# Patient Record
Sex: FEMALE | Race: WHITE | Employment: FULL TIME
[De-identification: names, ages, dates, MRNs, and addresses within clinical notes are randomized per-mention and may not be internally consistent; named-entity substitution may affect disease eponyms.]

---

## 2017-01-09 ENCOUNTER — SURGERY (OUTPATIENT)
Age: 52
End: 2017-01-09

## 2017-01-09 ENCOUNTER — TELEPHONE (OUTPATIENT)
Dept: GASTROENTEROLOGY | Facility: CLINIC | Age: 52
End: 2017-01-09

## 2017-04-19 ENCOUNTER — TELEPHONE (OUTPATIENT)
Dept: SURGERY | Facility: CLINIC | Age: 52
End: 2017-04-19

## 2017-04-19 NOTE — TELEPHONE ENCOUNTER
Left msg notifying pt that mammogram has been authorized & contact for central scheduling provided. Pt encouraged to contact office w/ questions/concerns.

## 2017-05-03 ENCOUNTER — HOSPITAL ENCOUNTER (OUTPATIENT)
Dept: MAMMOGRAPHY | Facility: HOSPITAL | Age: 52
Discharge: HOME | End: 2017-05-03
Attending: SURGERY
Payer: COMMERCIAL

## 2017-05-03 ENCOUNTER — TELEPHONE (OUTPATIENT)
Dept: SURGERY | Facility: CLINIC | Age: 52
End: 2017-05-03

## 2017-05-03 DIAGNOSIS — R92.8 ABNORMAL MAMMOGRAM OF LEFT BREAST: ICD-10-CM

## 2017-05-03 PROCEDURE — 77065 DX MAMMO INCL CAD UNI: CPT | Performed by: RADIOLOGY

## 2017-05-03 PROCEDURE — 77061 BREAST TOMOSYNTHESIS UNI: CPT | Performed by: RADIOLOGY

## 2017-05-10 ENCOUNTER — TELEPHONE (OUTPATIENT)
Dept: SURGERY | Facility: CLINIC | Age: 52
End: 2017-05-10

## 2017-05-15 ENCOUNTER — TELEPHONE (OUTPATIENT)
Dept: SURGERY | Facility: CLINIC | Age: 52
End: 2017-05-15

## 2017-05-15 NOTE — TELEPHONE ENCOUNTER
3rd attempt: Left msg for pt to return call to discuss imaging & poc    Pt was to be notified that Dr. David Vinson reviewed mammogram results and all looks stable. She should be able to resume annual screening per her PCP in November.  If she has any concerns wi

## 2017-05-17 ENCOUNTER — TELEPHONE (OUTPATIENT)
Dept: SURGERY | Facility: CLINIC | Age: 52
End: 2017-05-17

## 2017-05-17 NOTE — TELEPHONE ENCOUNTER
Left msg notifying pt that Dr. Karen Schmitz reviewed mammogram results and all looks stable. She should be able to resume annual screening per her PCP in November. If she has any concerns with her incision or exam, pt is to see Dr. Karen Schmitz as needed.  Pt encourage

## 2017-12-11 ENCOUNTER — TELEPHONE (OUTPATIENT)
Dept: INTERNAL MEDICINE CLINIC | Facility: CLINIC | Age: 52
End: 2017-12-11

## 2017-12-11 DIAGNOSIS — Z00.00 ROUTINE GENERAL MEDICAL EXAMINATION AT HEALTH CARE FACILITY: Primary | ICD-10-CM

## 2017-12-11 NOTE — TELEPHONE ENCOUNTER
Pt is scheduled for a px on 2/8. Pt asking to complete labs prior to appointment. Please advise on orders.

## 2017-12-14 NOTE — TELEPHONE ENCOUNTER
Pt returned call, states she would like to have lab work done at [address] [place] [address].

## 2017-12-14 NOTE — TELEPHONE ENCOUNTER
Zee He - please confirm with pt which lab is preferred ([facility] or [address] [place] [address]) so that orders can be generated.

## 2017-12-14 NOTE — TELEPHONE ENCOUNTER
CBC with differential, CMP, lipid panel, TSH, UA, B12, vitamin D. Routine physical.  Please check–patient may need to go to [facility] for her labs–she has signal for her insurance.   If so generate under [facility], have this printed out and either mail to her or h

## 2017-12-20 ENCOUNTER — OFFICE VISIT (OUTPATIENT)
Dept: FAMILY MEDICINE CLINIC | Facility: CLINIC | Age: 52
End: 2017-12-20

## 2017-12-20 VITALS
TEMPERATURE: 98 F | WEIGHT: 204 LBS | DIASTOLIC BLOOD PRESSURE: 82 MMHG | SYSTOLIC BLOOD PRESSURE: 116 MMHG | BODY MASS INDEX: 30.92 KG/M2 | RESPIRATION RATE: 12 BRPM | HEIGHT: 68 IN | HEART RATE: 88 BPM | OXYGEN SATURATION: 98 %

## 2017-12-20 DIAGNOSIS — N30.90 CYSTITIS: Primary | ICD-10-CM

## 2017-12-20 PROCEDURE — 81003 URINALYSIS AUTO W/O SCOPE: CPT | Performed by: NURSE PRACTITIONER

## 2017-12-20 PROCEDURE — 99202 OFFICE O/P NEW SF 15 MIN: CPT | Performed by: NURSE PRACTITIONER

## 2017-12-20 RX ORDER — NITROFURANTOIN 25; 75 MG/1; MG/1
100 CAPSULE ORAL 2 TIMES DAILY
Qty: 10 CAPSULE | Refills: 0 | Status: SHIPPED | OUTPATIENT
Start: 2017-12-20 | End: 2017-12-25

## 2017-12-20 NOTE — PATIENT INSTRUCTIONS
-Discussed with patient that most UTI’s are resolved after 3 days on antibiotic, but should continue for 5-7 days if symptoms persist.    -May take OTC Azo as directed for bladder spasms/pain with urination.   *Caution: this turns urine and bodily secretion long, germs may start to grow out of control. Parts of the urinary tract  The infection can occur in any part of the urinary tract. · The kidneys collect and store urine. · The ureters carry urine from the kidneys to the bladder.   · The bladder hold

## 2017-12-20 NOTE — PROGRESS NOTES
CHIEF COMPLAINT:   Patient presents with:  Urinary        HPI:   Eulalia Kyle is a 45year old female presents with symptoms of UTI. Complaining of urinary frequency, urgency, dysuria starting last night. Symptoms have been worsening since onset.   D NEURO: no headaches.       EXAM:   /82 (BP Location: Right arm, Patient Position: Sitting, Cuff Size: adult)   Pulse 88   Temp 98.4 °F (36.9 °C) (Oral)   Resp 12   Ht 68\"   Wt 204 lb   SpO2 98%   BMI 31.02 kg/m²   GENERAL: well developed, well nouris The patient is asked to return or follow up with PCP in 3 days if not better. Advised to seek immediate care for worsening symptoms. The patient indicates understanding of these issues and agrees to the plan.     Patient Instructions     -Discussed with · Bacteria on the skin outside the rectum may travel into the urethra. This is more common in women since the rectum and urethra are closer to each other than in men.  Wiping from front to back after using the toilet and keeping the area clean can help prev

## 2018-01-17 ENCOUNTER — OFFICE VISIT (OUTPATIENT)
Dept: FAMILY MEDICINE CLINIC | Facility: CLINIC | Age: 53
End: 2018-01-17

## 2018-01-17 ENCOUNTER — OFFICE VISIT (OUTPATIENT)
Dept: SURGERY | Facility: CLINIC | Age: 53
End: 2018-01-17

## 2018-01-17 ENCOUNTER — HOSPITAL ENCOUNTER (OUTPATIENT)
Facility: HOSPITAL | Age: 53
Discharge: HOME | End: 2018-01-17
Attending: SURGERY
Payer: COMMERCIAL

## 2018-01-17 VITALS
TEMPERATURE: 98 F | OXYGEN SATURATION: 97 % | DIASTOLIC BLOOD PRESSURE: 80 MMHG | HEART RATE: 92 BPM | RESPIRATION RATE: 18 BRPM | SYSTOLIC BLOOD PRESSURE: 120 MMHG

## 2018-01-17 VITALS
SYSTOLIC BLOOD PRESSURE: 120 MMHG | DIASTOLIC BLOOD PRESSURE: 80 MMHG | RESPIRATION RATE: 16 BRPM | TEMPERATURE: 98 F | OXYGEN SATURATION: 97 % | HEART RATE: 92 BPM

## 2018-01-17 DIAGNOSIS — N60.09 CYST OF BREAST, UNSPECIFIED LATERALITY: Primary | ICD-10-CM

## 2018-01-17 DIAGNOSIS — N30.01 ACUTE CYSTITIS WITH HEMATURIA: Primary | ICD-10-CM

## 2018-01-17 DIAGNOSIS — R30.0 DYSURIA: ICD-10-CM

## 2018-01-17 DIAGNOSIS — D24.2 BREAST FIBROADENOMA, LEFT: ICD-10-CM

## 2018-01-17 LAB
APPEARANCE: CLEAR
GLUCOSE (URINE DIPSTICK): 100 MG/DL
MULTISTIX LOT#: ABNORMAL NUMERIC
NITRITE, URINE: POSITIVE
PH, URINE: 5 (ref 4.5–8)
PROTEIN (URINE DIPSTICK): >300 MG/DL
SPECIFIC GRAVITY: 1.01 (ref 1–1.03)
UROBILINOGEN,SEMI-QN: 2 MG/DL (ref 0–1.9)

## 2018-01-17 PROCEDURE — 99213 OFFICE O/P EST LOW 20 MIN: CPT | Performed by: PHYSICIAN ASSISTANT

## 2018-01-17 PROCEDURE — 99214 OFFICE O/P EST MOD 30 MIN: CPT | Performed by: SURGERY

## 2018-01-17 PROCEDURE — 81003 URINALYSIS AUTO W/O SCOPE: CPT | Performed by: PHYSICIAN ASSISTANT

## 2018-01-17 PROCEDURE — 99211 OFF/OP EST MAY X REQ PHY/QHP: CPT | Performed by: SURGERY

## 2018-01-17 RX ORDER — SULFAMETHOXAZOLE AND TRIMETHOPRIM 800; 160 MG/1; MG/1
1 TABLET ORAL 2 TIMES DAILY
Qty: 10 TABLET | Refills: 0 | Status: SHIPPED | OUTPATIENT
Start: 2018-01-17 | End: 2018-01-22

## 2018-01-17 NOTE — PROGRESS NOTES
CHIEF COMPLAINT:     Patient presents with:  Urinary: states symptoms never completely go away, didn't take abx regularly       HPI:    Cecil Gutierrez is a 45year old female who presents with symptoms of UTI.  Complaining of urinary frequency and urgen Onset   • Other [OTHER] Mother    • Lipids Neg      hyperlipidemia, also no family h/o vascular disease      Smoking status: Former Smoker                                                              Packs/day: 1.00      Years: 10.00        Quit date: 1/1/ go away, didn't take abx regularly). Symptoms are consistent with:    Dysuria  Acute cystitis with hematuria  (primary encounter diagnosis)    PLAN   Increase water intake. Avoid caffenated beverages as can be bladder irritants.   Urine culture discussed, e

## 2018-02-08 ENCOUNTER — HOSPITAL ENCOUNTER (OUTPATIENT)
Dept: MAMMOGRAPHY | Facility: HOSPITAL | Age: 53
Discharge: HOME | End: 2018-02-08
Attending: SURGERY
Payer: COMMERCIAL

## 2018-02-08 ENCOUNTER — OFFICE VISIT (OUTPATIENT)
Dept: INTERNAL MEDICINE CLINIC | Facility: CLINIC | Age: 53
End: 2018-02-08

## 2018-02-08 VITALS
BODY MASS INDEX: 31.07 KG/M2 | HEART RATE: 82 BPM | SYSTOLIC BLOOD PRESSURE: 127 MMHG | RESPIRATION RATE: 16 BRPM | DIASTOLIC BLOOD PRESSURE: 82 MMHG | HEIGHT: 68 IN | WEIGHT: 205 LBS

## 2018-02-08 DIAGNOSIS — Z00.00 ROUTINE GENERAL MEDICAL EXAMINATION AT A HEALTH CARE FACILITY: ICD-10-CM

## 2018-02-08 DIAGNOSIS — D24.2 BREAST FIBROADENOMA, LEFT: ICD-10-CM

## 2018-02-08 DIAGNOSIS — N60.09 CYST OF BREAST, UNSPECIFIED LATERALITY: ICD-10-CM

## 2018-02-08 DIAGNOSIS — E78.5 HYPERLIPIDEMIA, UNSPECIFIED HYPERLIPIDEMIA TYPE: Primary | ICD-10-CM

## 2018-02-08 PROCEDURE — 77062 BREAST TOMOSYNTHESIS BI: CPT | Performed by: SURGERY

## 2018-02-08 PROCEDURE — 77066 DX MAMMO INCL CAD BI: CPT | Performed by: SURGERY

## 2018-02-08 PROCEDURE — 99396 PREV VISIT EST AGE 40-64: CPT | Performed by: INTERNAL MEDICINE

## 2018-02-08 RX ORDER — FENOFIBRATE 67 MG/1
67 CAPSULE ORAL
Qty: 90 CAPSULE | Refills: 2 | Status: SHIPPED | OUTPATIENT
Start: 2018-02-08 | End: 2019-02-12

## 2018-02-09 NOTE — ASSESSMENT & PLAN NOTE
Patient has been on fenofibrate, due for recheck labs which have been ordered and pending. Will follow-up after completed. She has tolerated medications well thus far.

## 2018-02-09 NOTE — PATIENT INSTRUCTIONS
Problem List Items Addressed This Visit        Unprioritized    Hyperlipidemia - Primary     Patient has been on fenofibrate, due for recheck labs which have been ordered and pending. Will follow-up after completed.   She has tolerated medications well thu

## 2018-02-09 NOTE — ASSESSMENT & PLAN NOTE
Normal exam.  Labs as ordered. Skin check normal.  Multiple scattered atypical nevi, advised to follow-up with dermatology for an evaluation–[phone] for an appointment with Dr. Georgi Adkins.   Breast exam completed–no palpable abnormalities, discharge from th

## 2018-02-09 NOTE — PROGRESS NOTES
HPI:    Patient ID: Salvatore Gray is a 45year old female. Physical    Pap Smear,3 Years due on 08/12/2019  Mammogram,1 Yr due on 02/03/2019    lmp-1/20/2018.     G7,P3-NVD,4 MISCARRIAGES.  LCB-19 YRS BACK    Colonoscopy,10 Years due on 01/09/2027 Skin: Negative. Allergic/Immunologic: Negative. Neurological: Negative. Hematological: Negative. Psychiatric/Behavioral: Negative.                Current Outpatient Prescriptions:  fenofibrate micronized 67 MG Oral Cap Take 1 capsule (67 mg to bleeding. There is no rash, tenderness or lesion on the right labia. There is no rash, tenderness or lesion on the left labia. Uterus is not deviated, not enlarged and not tender. Cervix exhibits no motion tenderness, no discharge and no friability.  Right orifices intact. Rectal exam normal,no palpable abnormalities. Hemorrhoids-none significant. Brown stools,guaic negetive  Colonoscopy,10 Years due on 01/09/2027    Pelvic exam completed–no cervical movement tenderness, adnexal palpable abnormalities.   Pina Flynn

## 2018-11-02 ENCOUNTER — PATIENT MESSAGE (OUTPATIENT)
Dept: INTERNAL MEDICINE CLINIC | Facility: CLINIC | Age: 53
End: 2018-11-02

## 2018-11-03 NOTE — TELEPHONE ENCOUNTER
Routed to Dr QUEEN for advise, thanks.     Future Appointments   Date Time Provider Olga Burris   12/31/2018  9:30 AM Kaz Al MD [facility] Cari Fuchs

## 2018-11-03 NOTE — TELEPHONE ENCOUNTER
From: Marcy Leal [facility]  To: Trino Carlin MD  Sent: 11/2/2018 11:48 AM CDT  Subject: Referral Request    I wanted to have blood work done in advance of my 12/31 appointment and wanted to confirm if I need a referral for the lab.  I would use he lab at 16

## 2018-12-27 NOTE — TELEPHONE ENCOUNTER
Dr. Sheryl Valdez, please advise.  This is patient's second attempt to contact us for labs with no response for 1 month.    ----- Message -----  Sagar Push From: Placido Comer     Sent: 12/26/2018  7:46 AM CST       To: Jesús Monson MD  Subject: RE: Referral Request

## 2019-01-17 ENCOUNTER — TELEPHONE (OUTPATIENT)
Dept: INTERNAL MEDICINE CLINIC | Facility: CLINIC | Age: 54
End: 2019-01-17

## 2019-01-17 NOTE — TELEPHONE ENCOUNTER
BENRICE - please advise if you would like to order any labs prior to upcoming Px appt on 02/12/19.     LOV - 02/08/2018  Last labs - 08/2016

## 2019-01-19 ENCOUNTER — MED REC SCAN ONLY (OUTPATIENT)
Dept: INTERNAL MEDICINE CLINIC | Facility: CLINIC | Age: 54
End: 2019-01-19

## 2019-01-19 ENCOUNTER — OFFICE VISIT (OUTPATIENT)
Dept: DERMATOLOGY CLINIC | Facility: CLINIC | Age: 54
End: 2019-01-19
Payer: COMMERCIAL

## 2019-01-19 DIAGNOSIS — D23.5 BENIGN NEOPLASM OF SKIN OF TRUNK, EXCEPT SCROTUM: Primary | ICD-10-CM

## 2019-01-19 DIAGNOSIS — L82.1 SEBORRHEIC KERATOSES: ICD-10-CM

## 2019-01-19 DIAGNOSIS — D23.4 BENIGN NEOPLASM OF SCALP AND SKIN OF NECK: ICD-10-CM

## 2019-01-19 DIAGNOSIS — D23.60 BENIGN NEOPLASM OF SKIN OF UPPER LIMB, INCLUDING SHOULDER, UNSPECIFIED LATERALITY: ICD-10-CM

## 2019-01-19 DIAGNOSIS — L57.0 AK (ACTINIC KERATOSIS): ICD-10-CM

## 2019-01-19 DIAGNOSIS — D22.9 MULTIPLE NEVI: ICD-10-CM

## 2019-01-19 PROCEDURE — 99212 OFFICE O/P EST SF 10 MIN: CPT | Performed by: DERMATOLOGY

## 2019-01-19 PROCEDURE — 99213 OFFICE O/P EST LOW 20 MIN: CPT | Performed by: DERMATOLOGY

## 2019-01-19 RX ORDER — MELOXICAM 7.5 MG/1
TABLET ORAL
COMMUNITY
Start: 2019-01-15 | End: 2019-03-13 | Stop reason: ALTCHOICE

## 2019-01-20 NOTE — TELEPHONE ENCOUNTER
The labs were ordered and in the chart 12/17/2017. If she had done these anytime before the 16 th of dec 2018 -she could have this completed,she took more than one year to come in for tests and hence could not get them  Reordered so please let her know.

## 2019-01-28 NOTE — PROGRESS NOTES
Itzel Berrios is a 52year old female. HPI:     CC:  Patient presents with:  Full Skin Exam: LOV 9/3/2016 Patient present with lesions of concern . Patient requesting full body skin exam .Patient has no personal hx of skin cancer.  Patient has patient Hyperlipidemia    • Lipid screening 7/26/2013    per NG   • PONV (postoperative nausea and vomiting)    • Visual impairment     glasses     Past Surgical History:   Procedure Laterality Date   • APPENDECTOMY     • BREAST BIOPSY Left 11/10/2016    Performed Asked        Seat Belt: Not Asked        Self-Exams: Not Asked        Grew up on a farm: Not Asked        History of tanning: Not Asked        Outdoor occupation: Not Asked        Pt has a pacemaker: No        Pt has a defibrillator: No        Breast feedi brown papule left scapula    Otherwise remarkable for lesions as noted on map. See Assessment /Plan for additional history and physical exam also:    Assessment / plan:    No orders of the defined types were placed in this encounter.       Meds & Refills f

## 2019-02-09 ENCOUNTER — APPOINTMENT (OUTPATIENT)
Dept: LAB | Age: 54
End: 2019-02-09
Attending: INTERNAL MEDICINE
Payer: COMMERCIAL

## 2019-02-09 PROCEDURE — 82607 VITAMIN B-12: CPT | Performed by: INTERNAL MEDICINE

## 2019-02-09 PROCEDURE — 81001 URINALYSIS AUTO W/SCOPE: CPT | Performed by: INTERNAL MEDICINE

## 2019-02-09 PROCEDURE — 36415 COLL VENOUS BLD VENIPUNCTURE: CPT | Performed by: INTERNAL MEDICINE

## 2019-02-09 PROCEDURE — 85025 COMPLETE CBC W/AUTO DIFF WBC: CPT | Performed by: INTERNAL MEDICINE

## 2019-02-09 PROCEDURE — 80061 LIPID PANEL: CPT | Performed by: INTERNAL MEDICINE

## 2019-02-09 PROCEDURE — 82306 VITAMIN D 25 HYDROXY: CPT | Performed by: INTERNAL MEDICINE

## 2019-02-09 PROCEDURE — 80053 COMPREHEN METABOLIC PANEL: CPT | Performed by: INTERNAL MEDICINE

## 2019-02-09 PROCEDURE — 84443 ASSAY THYROID STIM HORMONE: CPT | Performed by: INTERNAL MEDICINE

## 2019-02-12 ENCOUNTER — OFFICE VISIT (OUTPATIENT)
Dept: INTERNAL MEDICINE CLINIC | Facility: CLINIC | Age: 54
End: 2019-02-12
Payer: COMMERCIAL

## 2019-02-12 VITALS
SYSTOLIC BLOOD PRESSURE: 143 MMHG | HEIGHT: 68 IN | RESPIRATION RATE: 16 BRPM | BODY MASS INDEX: 31.67 KG/M2 | HEART RATE: 81 BPM | WEIGHT: 209 LBS | DIASTOLIC BLOOD PRESSURE: 82 MMHG

## 2019-02-12 DIAGNOSIS — E55.9 VITAMIN D DEFICIENCY: Primary | ICD-10-CM

## 2019-02-12 DIAGNOSIS — R92.2 DENSE BREAST: ICD-10-CM

## 2019-02-12 DIAGNOSIS — N95.0 POST-MENOPAUSAL BLEEDING: ICD-10-CM

## 2019-02-12 DIAGNOSIS — Z12.31 VISIT FOR SCREENING MAMMOGRAM: ICD-10-CM

## 2019-02-12 DIAGNOSIS — Z00.00 ROUTINE PHYSICAL EXAMINATION: ICD-10-CM

## 2019-02-12 DIAGNOSIS — E78.5 HYPERLIPIDEMIA, UNSPECIFIED HYPERLIPIDEMIA TYPE: ICD-10-CM

## 2019-02-12 PROCEDURE — 99396 PREV VISIT EST AGE 40-64: CPT | Performed by: INTERNAL MEDICINE

## 2019-02-12 RX ORDER — FENOFIBRATE 130 MG/1
130 CAPSULE ORAL
Qty: 90 CAPSULE | Refills: 2 | Status: SHIPPED | OUTPATIENT
Start: 2019-02-12 | End: 2019-11-13

## 2019-02-12 RX ORDER — ERGOCALCIFEROL 1.25 MG/1
50000 CAPSULE ORAL WEEKLY
Qty: 12 CAPSULE | Refills: 1 | Status: SHIPPED | OUTPATIENT
Start: 2019-02-12 | End: 2019-07-30

## 2019-02-12 NOTE — ASSESSMENT & PLAN NOTE
Normal exam.  Labs as ordered. Skin check normal.  No significant abnormal nevi. Scattered atypical nevi present, patient has been followed up per dermatology on a regular basis.   Breast exam completed–no palpable abnormalities, discharge from the nipple

## 2019-02-12 NOTE — PROGRESS NOTES
HPI:    Patient ID: Candelario Egan is a 52year old female.     Physical    Pap Smear,3 Years due on 2019  Mammogram due on 2019    lmp 2018-post menopausal by a few months after 1 year    Obstetric History     T0    L0    SAB0 Current Outpatient Medications:  ergocalciferol 80572 units Oral Cap Take 1 capsule (50,000 Units total) by mouth once a week.  Disp: 12 capsule Rfl: 1   fenofibrate micronized 130 MG Oral Cap Take 1 capsule (130 mg total) by mouth every morning before tenderness, discharge or bleeding. There is no rash, tenderness or lesion on the right labia. There is no rash, tenderness or lesion on the left labia. Uterus is not deviated, not enlarged and not tender.  Cervix exhibits no motion tenderness, no discharge abnormal nevi. Scattered atypical nevi present, patient has been followed up per dermatology on a regular basis. Breast exam completed–no palpable abnormalities, discharge from the nipples or axillary adenopathy.   Eleonoomari Marinelli synthesis mammogram last completed Visit:  Requested Prescriptions     Signed Prescriptions Disp Refills   • ergocalciferol 66280 units Oral Cap 12 capsule 1     Sig: Take 1 capsule (50,000 Units total) by mouth once a week.    • fenofibrate micronized 130 MG Oral Cap 90 capsule 2     Sig: T

## 2019-02-12 NOTE — ASSESSMENT & PLAN NOTE
Patient has been on fenofibrate at 67 mg daily which she has tolerated well. Triglyceride levels have come down from 368-307 but continues to remain elevated at this time. Strict restriction of starches, sugars, alcohol in the diet discussed.   Increase t

## 2019-02-12 NOTE — PATIENT INSTRUCTIONS
Problem List Items Addressed This Visit        Unprioritized    Hyperlipidemia     Patient has been on fenofibrate at 67 mg daily which she has tolerated well. Triglyceride levels have come down from 368-307 but continues to remain elevated at this time. far.  Immunizations-    Immunization History   Administered Date(s) Administered   • TD 09/12/2000   • TDAP 10/28/2011              Vitamin D deficiency - Primary     Patient is advised to restart on vitamin D 50,000 units once a week for the next 6 months

## 2019-02-12 NOTE — ASSESSMENT & PLAN NOTE
Had an almost normal menstrual cycle after about a whole year of absence of cycles. Ultrasound of the pelvis has been ordered and will follow-up after completion.     Advised to see gynecology–Dr. Chris Busby for an evaluation after the ultrasound, [phone]

## 2019-02-12 NOTE — ASSESSMENT & PLAN NOTE
Patient is advised to restart on vitamin D 50,000 units once a week for the next 6 months. Recheck labs after supplementation.

## 2019-03-07 ENCOUNTER — HOSPITAL ENCOUNTER (OUTPATIENT)
Dept: ULTRASOUND IMAGING | Age: 54
Discharge: HOME | End: 2019-03-07
Attending: INTERNAL MEDICINE
Payer: COMMERCIAL

## 2019-03-07 ENCOUNTER — HOSPITAL ENCOUNTER (OUTPATIENT)
Dept: CT IMAGING | Age: 54
Discharge: HOME | End: 2019-03-07
Attending: INTERNAL MEDICINE

## 2019-03-07 DIAGNOSIS — Z13.9 SCREENING PROCEDURE: ICD-10-CM

## 2019-03-07 DIAGNOSIS — N95.0 POST-MENOPAUSAL BLEEDING: ICD-10-CM

## 2019-03-07 PROCEDURE — 76856 US EXAM PELVIC COMPLETE: CPT | Performed by: INTERNAL MEDICINE

## 2019-03-07 PROCEDURE — 76830 TRANSVAGINAL US NON-OB: CPT | Performed by: INTERNAL MEDICINE

## 2019-03-07 NOTE — PROGRESS NOTES
[facility] [address] SCORE=0  OA=127/80  Cholestec labs as follows:declined. Just had labs 2/9/19  TC=  HDL=  LDL=  TG=  GLUCOSE=  All results and risk factors discussed with patient; all questions and concerns addressed.   Binu Franco

## 2019-03-13 ENCOUNTER — OFFICE VISIT (OUTPATIENT)
Dept: INTERNAL MEDICINE CLINIC | Facility: CLINIC | Age: 54
End: 2019-03-13
Payer: COMMERCIAL

## 2019-03-13 VITALS
HEIGHT: 68 IN | DIASTOLIC BLOOD PRESSURE: 84 MMHG | SYSTOLIC BLOOD PRESSURE: 121 MMHG | BODY MASS INDEX: 31.19 KG/M2 | RESPIRATION RATE: 18 BRPM | HEART RATE: 79 BPM | TEMPERATURE: 98 F | WEIGHT: 205.81 LBS

## 2019-03-13 DIAGNOSIS — R91.1 LUNG NODULE: ICD-10-CM

## 2019-03-13 DIAGNOSIS — E78.5 HYPERLIPIDEMIA, UNSPECIFIED HYPERLIPIDEMIA TYPE: ICD-10-CM

## 2019-03-13 DIAGNOSIS — E55.9 VITAMIN D DEFICIENCY: ICD-10-CM

## 2019-03-13 DIAGNOSIS — N95.0 POST-MENOPAUSAL BLEEDING: Primary | ICD-10-CM

## 2019-03-13 PROCEDURE — 99212 OFFICE O/P EST SF 10 MIN: CPT | Performed by: INTERNAL MEDICINE

## 2019-03-13 PROCEDURE — 99214 OFFICE O/P EST MOD 30 MIN: CPT | Performed by: INTERNAL MEDICINE

## 2019-03-13 RX ORDER — RANITIDINE 150 MG/1
150 TABLET ORAL DAILY PRN
COMMUNITY
End: 2020-09-03 | Stop reason: ALTCHOICE

## 2019-03-13 NOTE — ASSESSMENT & PLAN NOTE
Postmenopausal bleeding–ultrasound of the uterus completed. Ovaries look normal.  Small fibroids noted. Endometrial thickness at 8.4 mm. Patient has not had a subsequent episode. She has an appointment upcoming with gynecology for further evaluation.

## 2019-03-13 NOTE — ASSESSMENT & PLAN NOTE
Patient's dose of fenofibrate has been increased to 130 mg daily. Advised strict diet control of fatty foods, fried foods, desserts and sugars. Recheck labs as ordered. CT calcium scoring study showed no significant calcified plaques noted.

## 2019-03-13 NOTE — ASSESSMENT & PLAN NOTE
CT calcium scoring heart scan did show multiple lung nodules bilaterally but predominantly on the right side with a calcified granuloma. No lymphadenopathy noted. Patient has a remote history of smoking about 7 pack years, quit about 20years of age.   Sh

## 2019-03-13 NOTE — PATIENT INSTRUCTIONS
Problem List Items Addressed This Visit        Unprioritized    Hyperlipidemia     Patient's dose of fenofibrate has been increased to 130 mg daily. Advised strict diet control of fatty foods, fried foods, desserts and sugars. Recheck labs as ordered.   C

## 2019-03-13 NOTE — PROGRESS NOTES
HPI:    Patient ID: Arvind Weston is a 52year old female. Impression    CONCLUSION:   1. CT calcium scoring over read was performed.    2. Multiple ill-defined ground-glass density nodular opacities throughout predominantly the right lung, which me sedentary lifestyle, dyslipidemia, family history and obesity. Review of Systems   Constitutional: Negative. HENT: Negative. Eyes: Negative. Respiratory: Negative. Cardiovascular: Negative. Negative for chest pain.    Gastrointestinal: Ne range of motion. She exhibits no edema or tenderness. Lymphadenopathy:     She has no cervical adenopathy. Neurological: She is alert and oriented to person, place, and time. She has normal reflexes. No cranial nerve deficit.  She exhibits normal muscle Placed This Encounter      Vitamin D, 25-Hydroxy [E]      Meds This Visit:  Requested Prescriptions      No prescriptions requested or ordered in this encounter       Imaging & Referrals:  CT CHEST(CONTRAST ONLY) (CPT=71260)       #[id number]

## 2019-03-13 NOTE — ASSESSMENT & PLAN NOTE
Advised to take vitamin D 50,000 units once a week for the next 12 weeks and recheck labs as ordered

## 2019-03-16 ENCOUNTER — HOSPITAL ENCOUNTER (OUTPATIENT)
Dept: MAMMOGRAPHY | Facility: HOSPITAL | Age: 54
Discharge: HOME | End: 2019-03-16
Attending: INTERNAL MEDICINE
Payer: COMMERCIAL

## 2019-03-16 DIAGNOSIS — Z12.31 VISIT FOR SCREENING MAMMOGRAM: ICD-10-CM

## 2019-03-16 DIAGNOSIS — R92.2 DENSE BREAST: ICD-10-CM

## 2019-03-16 PROCEDURE — 77063 BREAST TOMOSYNTHESIS BI: CPT | Performed by: INTERNAL MEDICINE

## 2019-03-16 PROCEDURE — 77067 SCR MAMMO BI INCL CAD: CPT | Performed by: INTERNAL MEDICINE

## 2019-03-21 ENCOUNTER — OFFICE VISIT (OUTPATIENT)
Dept: OBGYN CLINIC | Facility: CLINIC | Age: 54
End: 2019-03-21
Payer: COMMERCIAL

## 2019-03-21 ENCOUNTER — HOSPITAL ENCOUNTER (OUTPATIENT)
Dept: CT IMAGING | Facility: HOSPITAL | Age: 54
Discharge: HOME | End: 2019-03-21
Attending: INTERNAL MEDICINE
Payer: COMMERCIAL

## 2019-03-21 VITALS
BODY MASS INDEX: 31 KG/M2 | SYSTOLIC BLOOD PRESSURE: 133 MMHG | HEART RATE: 67 BPM | DIASTOLIC BLOOD PRESSURE: 83 MMHG | WEIGHT: 202 LBS

## 2019-03-21 DIAGNOSIS — R91.1 LUNG NODULE: ICD-10-CM

## 2019-03-21 DIAGNOSIS — N95.0 POST-MENOPAUSAL BLEEDING: Primary | ICD-10-CM

## 2019-03-21 LAB — CREAT BLD-MCNC: 0.9 MG/DL (ref 0.5–1.5)

## 2019-03-21 PROCEDURE — 99203 OFFICE O/P NEW LOW 30 MIN: CPT | Performed by: OBSTETRICS & GYNECOLOGY

## 2019-03-21 PROCEDURE — 71260 CT THORAX DX C+: CPT | Performed by: INTERNAL MEDICINE

## 2019-03-21 PROCEDURE — 82565 ASSAY OF CREATININE: CPT

## 2019-03-21 NOTE — PROGRESS NOTES
Nemiah Lesch is a 52year old female [id number] Patient's last menstrual period was 09/05/2018. Patient presents with:  Gyn Problem: POSTMENOPAUSAL BLEEDING    Patient presents today for postmenopausal bleeding.  She states that she had a menses in Sept Worry: Not on file        Inability: Not on file      Transportation needs:        Medical: Not on file        Non-medical: Not on file    Tobacco Use      Smoking status: Former Smoker        Packs/day: 1.00        Years: 10.00        Pack years: 8 Asked        Reaction to local anesthetic: No    Social History Narrative      Not on file      MEDICATIONS:    Current Outpatient Medications:   •  Multiple Vitamins-Minerals (MULTIVITAMIN ADULT OR), Take by mouth., Disp: , Rfl:   •  raNITIdine HCl 150 MG

## 2019-03-25 ENCOUNTER — OFFICE VISIT (OUTPATIENT)
Dept: OBGYN CLINIC | Facility: CLINIC | Age: 54
End: 2019-03-25
Payer: COMMERCIAL

## 2019-03-25 VITALS — SYSTOLIC BLOOD PRESSURE: 115 MMHG | DIASTOLIC BLOOD PRESSURE: 76 MMHG | HEART RATE: 84 BPM

## 2019-03-25 DIAGNOSIS — N84.1 CERVICAL POLYP: ICD-10-CM

## 2019-03-25 DIAGNOSIS — N95.0 POSTMENOPAUSAL BLEEDING: Primary | ICD-10-CM

## 2019-03-25 PROCEDURE — 58100 BIOPSY OF UTERUS LINING: CPT | Performed by: OBSTETRICS & GYNECOLOGY

## 2019-03-25 NOTE — PROCEDURES
Endometrial Biopsy     Pre-Procedure Care:   Consent was obtained. Procedure/risks were explained. Questions were answered. Correct patient was identified. Correct side and site were confirmed. Indication: PMB    Pre-Medications:     The patient was

## 2019-03-29 ENCOUNTER — HOSPITAL ENCOUNTER (OUTPATIENT)
Dept: MAMMOGRAPHY | Facility: HOSPITAL | Age: 54
Discharge: HOME | End: 2019-03-29
Attending: INTERNAL MEDICINE
Payer: COMMERCIAL

## 2019-03-29 ENCOUNTER — HOSPITAL ENCOUNTER (OUTPATIENT)
Dept: ULTRASOUND IMAGING | Facility: HOSPITAL | Age: 54
Discharge: HOME | End: 2019-03-29
Attending: INTERNAL MEDICINE
Payer: COMMERCIAL

## 2019-03-29 DIAGNOSIS — R92.8 ABNORMAL MAMMOGRAM: ICD-10-CM

## 2019-03-29 DIAGNOSIS — R92.2 INCONCLUSIVE MAMMOGRAM DUE TO DENSE BREASTS: ICD-10-CM

## 2019-03-29 PROCEDURE — 77061 BREAST TOMOSYNTHESIS UNI: CPT | Performed by: INTERNAL MEDICINE

## 2019-03-29 PROCEDURE — 76642 ULTRASOUND BREAST LIMITED: CPT | Performed by: INTERNAL MEDICINE

## 2019-03-29 PROCEDURE — 77065 DX MAMMO INCL CAD UNI: CPT | Performed by: INTERNAL MEDICINE

## 2019-07-30 RX ORDER — ERGOCALCIFEROL 1.25 MG/1
50000 CAPSULE ORAL WEEKLY
Qty: 12 CAPSULE | Refills: 1 | Status: SHIPPED | OUTPATIENT
Start: 2019-07-30 | End: 2020-01-09

## 2019-07-30 NOTE — TELEPHONE ENCOUNTER
Review pended refill request as it does not fall under a protocol.     My Chart Message sent to pt requesting to complete labs    Last Rx: 2/12/19  LOV: Recent Visits  Date Type Provider Dept   03/13/19 Office Visit [facility]   15

## 2019-11-11 NOTE — TELEPHONE ENCOUNTER
Pharmacy called in stating that patient requested refill on medication over the weekend, but they were unable to send electronically. Please advise.      Current Outpatient Medications:   •  fenofibrate micronized 130 MG Oral Cap, Take 1 capsule (130 mg

## 2019-11-13 RX ORDER — FENOFIBRATE 130 MG/1
130 CAPSULE ORAL
Qty: 90 CAPSULE | Refills: 1 | Status: SHIPPED | OUTPATIENT
Start: 2019-11-13 | End: 2020-05-12

## 2019-11-14 NOTE — TELEPHONE ENCOUNTER
Refill passed per [facility], [facility] protocol.   Cholesterol Medications  Protocol Criteria:  · Appointment scheduled in the past 12 months or in the next 3 months  · ALT & LDL on file in the past 12 months  · ALT result < 80  · LDL result <130   Recent Outpat

## 2020-01-09 RX ORDER — ERGOCALCIFEROL 1.25 MG/1
CAPSULE ORAL
Qty: 12 CAPSULE | Refills: 1 | Status: SHIPPED | OUTPATIENT
Start: 2020-01-09 | End: 2020-06-23

## 2020-03-02 ENCOUNTER — OFFICE VISIT (OUTPATIENT)
Dept: FAMILY MEDICINE CLINIC | Facility: CLINIC | Age: 55
End: 2020-03-02
Payer: COMMERCIAL

## 2020-03-02 VITALS
OXYGEN SATURATION: 98 % | DIASTOLIC BLOOD PRESSURE: 80 MMHG | RESPIRATION RATE: 14 BRPM | SYSTOLIC BLOOD PRESSURE: 124 MMHG | HEART RATE: 73 BPM

## 2020-03-02 DIAGNOSIS — H10.11 ALLERGIC CONJUNCTIVITIS OF RIGHT EYE: Primary | ICD-10-CM

## 2020-03-02 PROCEDURE — 99212 OFFICE O/P EST SF 10 MIN: CPT | Performed by: NURSE PRACTITIONER

## 2020-03-02 RX ORDER — OLOPATADINE HYDROCHLORIDE 2 MG/ML
1 SOLUTION/ DROPS OPHTHALMIC DAILY
Qty: 2.5 ML | Refills: 0 | Status: SHIPPED | OUTPATIENT
Start: 2020-03-02 | End: 2020-03-09

## 2020-03-02 NOTE — PATIENT INSTRUCTIONS
Conjunctivitis, Allergic    Conjunctivitis is an irritation of a thin membrane in the eye. This membrane is called the conjunctiva. It covers the white of the eye and the inside of the eyelid.  The condition is often called pink eye or red eye because the © 8223-8111 [employer] [address] [facility], [employer] [place]. All rights reserved. This information is not intended as a substitute for professional medical care. Always follow your healthcare professional's instructions.

## 2020-03-02 NOTE — PROGRESS NOTES
CHIEF COMPLAINT:   Patient presents with:  Pink Eye      HPI:   Jess Jorge is a 62year old female who presents with chief concern of \"pink eye\".  Right eye discomfort x 1 month, eye is itchy, she reports RIGHT eye with increased crusting in the m (Other) Mother    • Lipids Neg         hyperlipidemia, also no family h/o vascular disease      Social History    Tobacco Use      Smoking status: Former Smoker        Packs/day: 1.00        Years: 10.00        Pack years: 10        Types: Cigarettes Apply 1 drop to eye daily for 7 days. Risks, benefits, complications and side effects of meds discussed. Follow up w/ ophthalmologist if no improvement in 1 week. Patient voiced understanding.

## 2020-05-12 RX ORDER — FENOFIBRATE 130 MG/1
CAPSULE ORAL
Qty: 90 CAPSULE | Refills: 0 | Status: SHIPPED | OUTPATIENT
Start: 2020-05-12 | End: 2020-08-10

## 2020-06-23 RX ORDER — ERGOCALCIFEROL 1.25 MG/1
CAPSULE ORAL
Qty: 12 CAPSULE | Refills: 0 | Status: SHIPPED | OUTPATIENT
Start: 2020-06-23 | End: 2022-01-07

## 2020-08-10 RX ORDER — FENOFIBRATE 130 MG/1
CAPSULE ORAL
Qty: 90 CAPSULE | Refills: 1 | Status: SHIPPED | OUTPATIENT
Start: 2020-08-10 | End: 2021-11-24

## 2020-09-03 ENCOUNTER — APPOINTMENT (OUTPATIENT)
Dept: LAB | Facility: HOSPITAL | Age: 55
End: 2020-09-03
Attending: INTERNAL MEDICINE
Payer: COMMERCIAL

## 2020-09-03 ENCOUNTER — OFFICE VISIT (OUTPATIENT)
Dept: INTERNAL MEDICINE CLINIC | Facility: CLINIC | Age: 55
End: 2020-09-03
Payer: COMMERCIAL

## 2020-09-03 VITALS
HEART RATE: 73 BPM | WEIGHT: 208.63 LBS | DIASTOLIC BLOOD PRESSURE: 83 MMHG | BODY MASS INDEX: 31.62 KG/M2 | HEIGHT: 68 IN | SYSTOLIC BLOOD PRESSURE: 123 MMHG

## 2020-09-03 DIAGNOSIS — Z12.4 ROUTINE PAPANICOLAOU SMEAR: ICD-10-CM

## 2020-09-03 DIAGNOSIS — Z78.0 MENOPAUSE: ICD-10-CM

## 2020-09-03 DIAGNOSIS — Z00.00 ROUTINE GENERAL MEDICAL EXAMINATION AT A HEALTH CARE FACILITY: Primary | ICD-10-CM

## 2020-09-03 DIAGNOSIS — Z00.00 WELLNESS EXAMINATION: ICD-10-CM

## 2020-09-03 DIAGNOSIS — E55.9 VITAMIN D DEFICIENCY: ICD-10-CM

## 2020-09-03 DIAGNOSIS — R91.1 LUNG NODULE: ICD-10-CM

## 2020-09-03 DIAGNOSIS — E78.5 HYPERLIPIDEMIA, UNSPECIFIED HYPERLIPIDEMIA TYPE: ICD-10-CM

## 2020-09-03 PROBLEM — N95.0 POST-MENOPAUSAL BLEEDING: Status: RESOLVED | Noted: 2019-02-12 | Resolved: 2020-09-03

## 2020-09-03 LAB
ALBUMIN SERPL-MCNC: 3.8 G/DL (ref 3.4–5)
ALBUMIN/GLOB SERPL: 1.1 {RATIO} (ref 1–2)
ALP LIVER SERPL-CCNC: 84 U/L (ref 46–118)
ALT SERPL-CCNC: 42 U/L (ref 13–56)
ANION GAP SERPL CALC-SCNC: 5 MMOL/L (ref 0–18)
AST SERPL-CCNC: 21 U/L (ref 15–37)
BACTERIA UR QL AUTO: NEGATIVE /HPF
BASOPHILS # BLD AUTO: 0.07 X10(3) UL (ref 0–0.2)
BASOPHILS NFR BLD AUTO: 1 %
BILIRUB SERPL-MCNC: 0.6 MG/DL (ref 0.1–2)
BILIRUB UR QL: NEGATIVE
BUN BLD-MCNC: 17 MG/DL (ref 7–18)
BUN/CREAT SERPL: 14.4 (ref 10–20)
CALCIUM BLD-MCNC: 9.6 MG/DL (ref 8.5–10.1)
CHLORIDE SERPL-SCNC: 110 MMOL/L (ref 98–112)
CHOLEST SMN-MCNC: 176 MG/DL (ref ?–200)
CO2 SERPL-SCNC: 27 MMOL/L (ref 21–32)
COLOR UR: YELLOW
CREAT BLD-MCNC: 1.18 MG/DL (ref 0.55–1.02)
DEPRECATED RDW RBC AUTO: 38.5 FL (ref 35.1–46.3)
EOSINOPHIL # BLD AUTO: 0.18 X10(3) UL (ref 0–0.7)
EOSINOPHIL NFR BLD AUTO: 2.6 %
ERYTHROCYTE [DISTWIDTH] IN BLOOD BY AUTOMATED COUNT: 12.7 % (ref 11–15)
GLOBULIN PLAS-MCNC: 3.4 G/DL (ref 2.8–4.4)
GLUCOSE BLD-MCNC: 87 MG/DL (ref 70–99)
GLUCOSE UR-MCNC: NEGATIVE MG/DL
HCT VFR BLD AUTO: 40.9 % (ref 35–48)
HDLC SERPL-MCNC: 37 MG/DL (ref 40–59)
HGB BLD-MCNC: 13.9 G/DL (ref 12–16)
IMM GRANULOCYTES # BLD AUTO: 0.02 X10(3) UL (ref 0–1)
IMM GRANULOCYTES NFR BLD: 0.3 %
KETONES UR-MCNC: NEGATIVE MG/DL
LDLC SERPL CALC-MCNC: 92 MG/DL (ref ?–100)
LYMPHOCYTES # BLD AUTO: 2.04 X10(3) UL (ref 1–4)
LYMPHOCYTES NFR BLD AUTO: 29.9 %
M PROTEIN MFR SERPL ELPH: 7.2 G/DL (ref 6.4–8.2)
MCH RBC QN AUTO: 28.5 PG (ref 26–34)
MCHC RBC AUTO-ENTMCNC: 34 G/DL (ref 31–37)
MCV RBC AUTO: 83.8 FL (ref 80–100)
MONOCYTES # BLD AUTO: 0.54 X10(3) UL (ref 0.1–1)
MONOCYTES NFR BLD AUTO: 7.9 %
NEUTROPHILS # BLD AUTO: 3.97 X10 (3) UL (ref 1.5–7.7)
NEUTROPHILS # BLD AUTO: 3.97 X10(3) UL (ref 1.5–7.7)
NEUTROPHILS NFR BLD AUTO: 58.3 %
NITRITE UR QL STRIP.AUTO: NEGATIVE
NONHDLC SERPL-MCNC: 139 MG/DL (ref ?–130)
OSMOLALITY SERPL CALC.SUM OF ELEC: 295 MOSM/KG (ref 275–295)
PATIENT FASTING Y/N/NP: YES
PATIENT FASTING Y/N/NP: YES
PH UR: 6 [PH] (ref 5–8)
PLATELET # BLD AUTO: 272 10(3)UL (ref 150–450)
POTASSIUM SERPL-SCNC: 4 MMOL/L (ref 3.5–5.1)
PROT UR-MCNC: NEGATIVE MG/DL
RBC # BLD AUTO: 4.88 X10(6)UL (ref 3.8–5.3)
RBC #/AREA URNS AUTO: 5 /HPF
SODIUM SERPL-SCNC: 142 MMOL/L (ref 136–145)
SP GR UR STRIP: 1.02 (ref 1–1.03)
TRIGL SERPL-MCNC: 233 MG/DL (ref 30–149)
TSI SER-ACNC: 2.36 MIU/ML (ref 0.36–3.74)
UROBILINOGEN UR STRIP-ACNC: <2
VIT B12 SERPL-MCNC: 431 PG/ML (ref 193–986)
VLDLC SERPL CALC-MCNC: 47 MG/DL (ref 0–30)
WBC # BLD AUTO: 6.8 X10(3) UL (ref 4–11)
WBC #/AREA URNS AUTO: 2 /HPF

## 2020-09-03 PROCEDURE — 82607 VITAMIN B-12: CPT | Performed by: INTERNAL MEDICINE

## 2020-09-03 PROCEDURE — 80053 COMPREHEN METABOLIC PANEL: CPT | Performed by: INTERNAL MEDICINE

## 2020-09-03 PROCEDURE — 3074F SYST BP LT 130 MM HG: CPT | Performed by: INTERNAL MEDICINE

## 2020-09-03 PROCEDURE — 85025 COMPLETE CBC W/AUTO DIFF WBC: CPT | Performed by: INTERNAL MEDICINE

## 2020-09-03 PROCEDURE — 99396 PREV VISIT EST AGE 40-64: CPT | Performed by: INTERNAL MEDICINE

## 2020-09-03 PROCEDURE — 84443 ASSAY THYROID STIM HORMONE: CPT | Performed by: INTERNAL MEDICINE

## 2020-09-03 PROCEDURE — 3079F DIAST BP 80-89 MM HG: CPT | Performed by: INTERNAL MEDICINE

## 2020-09-03 PROCEDURE — 36415 COLL VENOUS BLD VENIPUNCTURE: CPT | Performed by: INTERNAL MEDICINE

## 2020-09-03 PROCEDURE — 3008F BODY MASS INDEX DOCD: CPT | Performed by: INTERNAL MEDICINE

## 2020-09-03 PROCEDURE — 82306 VITAMIN D 25 HYDROXY: CPT | Performed by: INTERNAL MEDICINE

## 2020-09-03 PROCEDURE — 81001 URINALYSIS AUTO W/SCOPE: CPT | Performed by: INTERNAL MEDICINE

## 2020-09-03 PROCEDURE — 80061 LIPID PANEL: CPT | Performed by: INTERNAL MEDICINE

## 2020-09-03 NOTE — PROGRESS NOTES
HPI:   Alissa Mayo is a 60year old female who presents for an Annual Health Visit. Pap smear due at this time. Mammogram and bone density scan due. Colonoscopy due in 2027. Skin checks yearly due to she has an appointment scheduled.   Vision Packs/day: 1.00        Years: 10.00        Pack years: 10        Types: Cigarettes        Quit date: 1985        Years since quittin.7      Smokeless tobacco: Never Used    Alcohol use:  Yes      Alcohol/week: 2.0 standard drinks      Types: 2 Cristopher China normal and breath sounds normal. She has no wheezes. She has no rales. She exhibits no mass and no tenderness. Right breast exhibits no inverted nipple, no mass, no nipple discharge, no skin change and no tenderness.  Left breast exhibits no inverted nipple note and vitals reviewed. ASSESSMENT AND PLAN:   Jessica Sanchez was seen today for physical and pap.     Diagnoses and all orders for this visit:    Routine general medical examination at a health care facility  -     CARD TREADMILL STRESS, ADULT (CPT=93017 CT CHEST(CONTRAST ONLY) (CPT=71260)    Routine general medical examination at a [facility] care facility - Primary     Normal exam.  Labs as ordered. Skin check normal.  No significant abnormal nevi.   Breast exam completed–no palpable abnormalities, discharge Papanicolaou smear     Vitamin D deficiency     Screening for colorectal cancer     Benign neoplasm of skin     Lung nodule      Emerson Dudley MD  9/3/2020  10:16 AM

## 2020-09-03 NOTE — ASSESSMENT & PLAN NOTE
CT scan of the chest to evaluate multiple lung nodules showed a calcified granuloma and healing prior inflammatory process. Family history of lung cancer in her sister who was a smoker.   Patient herself has a remote history of smoking about 7 pack years a

## 2020-09-03 NOTE — ASSESSMENT & PLAN NOTE
Normal exam.  Labs as ordered. Skin check normal.  No significant abnormal nevi. Breast exam completed–no palpable abnormalities, discharge from the nipples or axillary adenopathy. Mammogram has been ordered but not completed.   She will have this done A

## 2020-09-03 NOTE — ASSESSMENT & PLAN NOTE
Patient has been on fenofibrate at 130 mg daily which she has tolerated well. Recheck labs are due at this time–orders provided. CT calcium scoring heart scan did not show any significant elevation in the calcium scores.   Consider treadmill stress test–o

## 2020-09-03 NOTE — PATIENT INSTRUCTIONS
Problem List Items Addressed This Visit        Unprioritized    Hyperlipidemia     Patient has been on fenofibrate at 130 mg daily which she has tolerated well. Recheck labs are due at this time–orders provided.   CT calcium scoring heart scan did not show (14)    LIPID PANEL    ASSAY, THYROID STIM HORMONE    URINALYSIS, ROUTINE    VITAMIN B12    VITAMIN D, 25-HYDROXY    Routine Papanicolaou smear     Pap smear completed today.          Relevant Orders    THINPREP PAP SMEAR B    HPV HIGH RISK , THIN PREP TISHA

## 2020-09-04 LAB
25(OH)D3 SERPL-MCNC: 29.7 NG/ML (ref 30–100)
HPV I/H RISK 1 DNA SPEC QL NAA+PROBE: NEGATIVE

## 2020-09-05 ENCOUNTER — OFFICE VISIT (OUTPATIENT)
Dept: DERMATOLOGY CLINIC | Facility: CLINIC | Age: 55
End: 2020-09-05
Payer: COMMERCIAL

## 2020-09-05 DIAGNOSIS — D23.4 BENIGN NEOPLASM OF SCALP AND SKIN OF NECK: ICD-10-CM

## 2020-09-05 DIAGNOSIS — D22.9 MULTIPLE NEVI: ICD-10-CM

## 2020-09-05 DIAGNOSIS — D23.60 BENIGN NEOPLASM OF SKIN OF UPPER LIMB, INCLUDING SHOULDER, UNSPECIFIED LATERALITY: ICD-10-CM

## 2020-09-05 DIAGNOSIS — L57.0 AK (ACTINIC KERATOSIS): Primary | ICD-10-CM

## 2020-09-05 DIAGNOSIS — L82.1 SEBORRHEIC KERATOSES: ICD-10-CM

## 2020-09-05 DIAGNOSIS — D23.5 BENIGN NEOPLASM OF SKIN OF TRUNK, EXCEPT SCROTUM: ICD-10-CM

## 2020-09-05 PROCEDURE — 17000 DESTRUCT PREMALG LESION: CPT | Performed by: DERMATOLOGY

## 2020-09-05 PROCEDURE — 99213 OFFICE O/P EST LOW 20 MIN: CPT | Performed by: DERMATOLOGY
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
## 2020-09-09 NOTE — TELEPHONE ENCOUNTER
Pt called requesting orders.  Pt made several attempts via [employer] please see communication on 11/2 [FreeTextEntry1] : Chest x-ray PA lateral September 9, 2020\par Sarcoidosis\par Cardiac size are normal\par Uncoiled aorta\par No parenchymal infiltrates pleural effusions or dominant pulmonary nodules\par We will change compared to chest x-ray of November 14, 2019\par \par Pulmonary 6-minute walk stress test September 9, 2020\par \par Room air O2 saturation 97\par Persistent tachycardia\par Desaturation to 89% on room air\par Impression borderline desaturation\par No indication for portable oxygen therapy\par \par \par Spirometry 2/27/2020\par  moderate  reduction flow rates\par No BD at FEV1\par Obstructive pattern\par \par FENO  11  ppb  1/30 20\par \par Chest x-ray PA lateral normal cardiac size Nov 14 2019\par Uncoiled aorta\par No clear parenchymal infiltrates pleural effusions or dominant pulmonary nodules\par No interval change compared to chest x-ray barring difference in technique dating back to November 7, 2018\par \par PFT November 14, 2019\par Mild reduction in flow rates with an obstructive pattern\par Normal total lung capacity 80% predicted\par Mild air trapping RV/TLC ratio 129% predicted.\par Moderate reduction diffusion 56% predicted with a loss of functioning alveolocapillary units\par Heme globin 13.8\par Bronchodilator response at small airways 29%\par \par High-dose flu vaccination administered Sept 9 2020\par \par DAta 1/30/20\par \par Serum electrolytes normal\par Glucose 103\par BUN 23 creatinine 1.02\par Serum calcium 9.7 in patient with sarcoidosis\par ACE level normal range\par \par High-dose flu vaccination administered September 9, 2020

## 2020-09-12 ENCOUNTER — HOSPITAL ENCOUNTER (OUTPATIENT)
Dept: CT IMAGING | Facility: HOSPITAL | Age: 55
Discharge: HOME | End: 2020-09-12
Attending: INTERNAL MEDICINE
Payer: COMMERCIAL

## 2020-09-12 DIAGNOSIS — R91.1 LUNG NODULE: ICD-10-CM

## 2020-09-12 PROCEDURE — 71260 CT THORAX DX C+: CPT | Performed by: INTERNAL MEDICINE

## 2020-09-21 ENCOUNTER — HOSPITAL ENCOUNTER (OUTPATIENT)
Dept: MAMMOGRAPHY | Facility: HOSPITAL | Age: 55
Discharge: HOME | End: 2020-09-21
Attending: INTERNAL MEDICINE
Payer: COMMERCIAL

## 2020-09-21 DIAGNOSIS — Z00.00 ROUTINE CHECK-UP: ICD-10-CM

## 2020-09-21 PROCEDURE — 77063 BREAST TOMOSYNTHESIS BI: CPT | Performed by: INTERNAL MEDICINE

## 2020-09-21 PROCEDURE — 77067 SCR MAMMO BI INCL CAD: CPT | Performed by: INTERNAL MEDICINE

## 2020-09-22 ENCOUNTER — HOSPITAL ENCOUNTER (OUTPATIENT)
Dept: BONE DENSITY | Age: 55
Discharge: HOME | End: 2020-09-22
Attending: INTERNAL MEDICINE
Payer: COMMERCIAL

## 2020-09-22 DIAGNOSIS — Z78.0 MENOPAUSE: ICD-10-CM

## 2020-09-22 PROCEDURE — 77080 DXA BONE DENSITY AXIAL: CPT | Performed by: INTERNAL MEDICINE

## 2020-09-26 ENCOUNTER — LAB ENCOUNTER (OUTPATIENT)
Dept: LAB | Age: 55
End: 2020-09-26
Attending: INTERNAL MEDICINE
Payer: COMMERCIAL

## 2020-09-26 DIAGNOSIS — Z01.812 PRE-PROCEDURE LAB EXAM: Primary | ICD-10-CM

## 2020-09-26 LAB — SARS-COV-2 RNA RESP QL NAA+PROBE: NOT DETECTED

## 2020-09-29 ENCOUNTER — HOSPITAL ENCOUNTER (OUTPATIENT)
Dept: CV DIAGNOSTICS | Facility: HOSPITAL | Age: 55
Discharge: HOME | End: 2020-09-29
Attending: INTERNAL MEDICINE
Payer: COMMERCIAL

## 2020-09-29 DIAGNOSIS — E78.5 HYPERLIPIDEMIA, UNSPECIFIED HYPERLIPIDEMIA TYPE: ICD-10-CM

## 2020-09-29 DIAGNOSIS — Z00.00 ROUTINE GENERAL MEDICAL EXAMINATION AT A HEALTH CARE FACILITY: ICD-10-CM

## 2020-09-29 PROCEDURE — 93016 CV STRESS TEST SUPVJ ONLY: CPT | Performed by: INTERNAL MEDICINE

## 2020-09-29 PROCEDURE — 93017 CV STRESS TEST TRACING ONLY: CPT | Performed by: INTERNAL MEDICINE

## 2020-09-29 PROCEDURE — 93018 CV STRESS TEST I&R ONLY: CPT | Performed by: INTERNAL MEDICINE

## 2021-02-10 ENCOUNTER — OFFICE VISIT (OUTPATIENT)
Dept: FAMILY MEDICINE CLINIC | Facility: CLINIC | Age: 56
End: 2021-02-10
Payer: COMMERCIAL

## 2021-02-10 VITALS
BODY MASS INDEX: 31.07 KG/M2 | TEMPERATURE: 98 F | HEART RATE: 86 BPM | WEIGHT: 205 LBS | OXYGEN SATURATION: 99 % | RESPIRATION RATE: 12 BRPM | HEIGHT: 68 IN | SYSTOLIC BLOOD PRESSURE: 127 MMHG | DIASTOLIC BLOOD PRESSURE: 86 MMHG

## 2021-02-10 DIAGNOSIS — R94.4 DECREASED GFR: ICD-10-CM

## 2021-02-10 DIAGNOSIS — R30.0 DYSURIA: ICD-10-CM

## 2021-02-10 DIAGNOSIS — N30.01 ACUTE CYSTITIS WITH HEMATURIA: Primary | ICD-10-CM

## 2021-02-10 LAB
GLUCOSE (URINE DIPSTICK): 100 MG/DL
MULTISTIX LOT#: 1044 NUMERIC
NITRITE, URINE: POSITIVE
PH, URINE: 5 (ref 4.5–8)
PROTEIN (URINE DIPSTICK): 300 MG/DL
SPECIFIC GRAVITY: 1.03 (ref 1–1.03)
UROBILINOGEN,SEMI-QN: 1 MG/DL (ref 0–1.9)

## 2021-02-10 PROCEDURE — 87086 URINE CULTURE/COLONY COUNT: CPT | Performed by: NURSE PRACTITIONER

## 2021-02-10 PROCEDURE — 99213 OFFICE O/P EST LOW 20 MIN: CPT | Performed by: NURSE PRACTITIONER

## 2021-02-10 PROCEDURE — 81003 URINALYSIS AUTO W/O SCOPE: CPT | Performed by: NURSE PRACTITIONER

## 2021-02-10 PROCEDURE — 3008F BODY MASS INDEX DOCD: CPT | Performed by: NURSE PRACTITIONER

## 2021-02-10 PROCEDURE — 3074F SYST BP LT 130 MM HG: CPT | Performed by: NURSE PRACTITIONER

## 2021-02-10 PROCEDURE — 3079F DIAST BP 80-89 MM HG: CPT | Performed by: NURSE PRACTITIONER

## 2021-02-10 RX ORDER — NITROFURANTOIN 25; 75 MG/1; MG/1
100 CAPSULE ORAL 2 TIMES DAILY
Qty: 14 CAPSULE | Refills: 0 | Status: SHIPPED | OUTPATIENT
Start: 2021-02-10 | End: 2021-02-10 | Stop reason: ALTCHOICE

## 2021-02-10 RX ORDER — SULFAMETHOXAZOLE AND TRIMETHOPRIM 800; 160 MG/1; MG/1
1 TABLET ORAL 2 TIMES DAILY
Qty: 10 TABLET | Refills: 0 | Status: SHIPPED | OUTPATIENT
Start: 2021-02-10 | End: 2021-02-15

## 2021-02-10 NOTE — PROGRESS NOTES
CHIEF COMPLAINT:   Patient presents with:  Urinary Symptoms      HPI:   Franci Cali is a 60year old female who presents with symptoms of dysuria, frequency and urgency for last 1 days. Symptoms have been about the same since onset.  Treatments t Alcohol use:  Yes      Alcohol/week: 2.0 standard drinks      Types: 2 Standard drinks or equivalent per week      Comment: weekends    Drug use: No        REVIEW OF SYSTEMS:   GENERAL: Denies fever, chills, or body aches  SKIN: no rashes, no skin wounds Sig: Take 1 tablet by mouth 2 (two) times daily for 5 days. Risk and benefits of medication discussed. Stressed importance of completing full course of antibiotic unless told otherwise.     The patient indicates understanding of these issues and ag Change undergarments daily or when soiled/sweaty. Only use water to clean perineum area. F/U:  - Go to Emergency Dept  for fever >101, any vomiting, or increase in abdominal, back, or flank pain, or no improvement after 48 hours of antibiotics.   -S Bladder infections are not contagious. You can't get one from someone else, from a toilet seat, or from sharing a bath. The most common cause of bladder infections is bacteria from the bowels.  The bacteria get onto the skin around the opening of the ureth · Drink plenty of fluids. This helps to prevent dehydration and flush out your bladder. Do this unless you must restrict fluids for other health reasons, or your healthcare provider told you not to. · Clean yourself correctly after going to the bathroom. Linsey last reviewed this educational content on 11/1/2019  © 8372-4107 [employer]. All rights reserved. This information is not intended as a substitute for professional medical care.  Always follow your healthcare professional's instructi

## 2021-02-10 NOTE — PATIENT INSTRUCTIONS
If a urine culture was sent out, we will contact you with the results in 48-72 hours via phone or [employer]. If positive, then we will call in an appropriate antibiotic or change antibiotic if needed.  If negative, then you should stop antibiotics as it i -Schedule appt with PCP or gyne if symptoms persist after completion of antibiotics,  if negative urine culture, if there is possibility of STD, if vaginal/penile discharge or other symptoms.       Bladder Infection, Female (Adult)     Urine normally doesn' The most common cause of bladder infections is bacteria from the bowels. The bacteria get onto the skin around the opening of the urethra. From there, they can get into the urine. Then they travel up to the bladder, causing inflammation and infection.  This · Clean yourself correctly after going to the bathroom. Wipe from front to back after using the toilet. This helps prevent the spread of bacteria. · Urinate more often. Don't try to hold urine in for a long time.   · Wear loose-fitting clothes and cotton u © 8183-0463 [employer]. All rights reserved. This information is not intended as a substitute for professional medical care. Always follow your healthcare professional's instructions.

## 2021-03-03 ENCOUNTER — OFFICE VISIT (OUTPATIENT)
Dept: INTERNAL MEDICINE CLINIC | Facility: CLINIC | Age: 56
End: 2021-03-03
Payer: COMMERCIAL

## 2021-03-03 VITALS
HEIGHT: 68 IN | DIASTOLIC BLOOD PRESSURE: 78 MMHG | HEART RATE: 74 BPM | WEIGHT: 210 LBS | BODY MASS INDEX: 31.83 KG/M2 | RESPIRATION RATE: 18 BRPM | TEMPERATURE: 97 F | SYSTOLIC BLOOD PRESSURE: 116 MMHG

## 2021-03-03 DIAGNOSIS — N18.31 STAGE 3A CHRONIC KIDNEY DISEASE (HCC): ICD-10-CM

## 2021-03-03 DIAGNOSIS — E78.5 HYPERLIPIDEMIA, UNSPECIFIED HYPERLIPIDEMIA TYPE: Primary | ICD-10-CM

## 2021-03-03 DIAGNOSIS — K76.0 HEPATIC STEATOSIS: ICD-10-CM

## 2021-03-03 PROCEDURE — 99214 OFFICE O/P EST MOD 30 MIN: CPT | Performed by: INTERNAL MEDICINE

## 2021-03-03 PROCEDURE — 3078F DIAST BP <80 MM HG: CPT | Performed by: INTERNAL MEDICINE

## 2021-03-03 PROCEDURE — 3074F SYST BP LT 130 MM HG: CPT | Performed by: INTERNAL MEDICINE

## 2021-03-03 PROCEDURE — 3008F BODY MASS INDEX DOCD: CPT | Performed by: INTERNAL MEDICINE

## 2021-03-03 NOTE — ASSESSMENT & PLAN NOTE
Moderate to severe fatty liver changes as noted on the CT scan. This was an incidental finding. No history of liver dysfunction. Strict diet control to restrict sugars, alcohol in the diet. Cut back on fatty foods like nuts, and desserts.   Restrict slime

## 2021-03-03 NOTE — ASSESSMENT & PLAN NOTE
Lipid panel shows persistent elevation in the triglycerides, currently 233. LDL cholesterol looks stable most likely due to the elevation in the triglycerides. Patient has been on fenofibrate which she has tolerated well thus far.   Advised to strictly re

## 2021-03-03 NOTE — PROGRESS NOTES
HPI:    Patient ID: Yon Greco is a 60year old female. Written by Royce Lakhani MD on 9/13/2020  3:44 PM  Pap smear looks normal, HPV testing negative.    Urine test look normal.   Thyroid function test look normal.   The blood sugars, liver fu sedentary lifestyle, dyslipidemia, family history and obesity. Kidney Problem  Pertinent negatives include no chest pain. Review of Systems   Constitutional: Negative. HENT: Negative. Eyes: Negative. Respiratory: Negative.     Oren Beltran There is no abdominal tenderness. There is no rebound. Musculoskeletal: Normal range of motion. General: No tenderness or edema. Lymphadenopathy:     She has no cervical adenopathy.    Neurological: She is alert and oriented to person, place, an This was an incidental finding. No history of liver dysfunction. Strict diet control to restrict sugars, alcohol in the diet. Cut back on fatty foods like nuts, and desserts. Restrict cheeses in the diet. Exercise for about 20 minutes daily.   Drink pl

## 2021-03-03 NOTE — PATIENT INSTRUCTIONS
Problem List Items Addressed This Visit        Unprioritized    Hepatic steatosis     Moderate to severe fatty liver changes as noted on the CT scan. This was an incidental finding. No history of liver dysfunction.   Strict diet control to restrict sugars

## 2021-03-03 NOTE — ASSESSMENT & PLAN NOTE
CKD stage III based on EGFR. Patient is otherwise asymptomatic. Blood pressures are well controlled. She does not take any NSAIDs on a regular basis but have advised to avoid Advil, Aleve, ibuprofen, naproxen, aspirin like medications over-the-counter.

## 2021-07-06 NOTE — TELEPHONE ENCOUNTER
Please review; protocol failed or has no protocol. Please advise on adding order for Vit D with outstanding labs.     Requested Prescriptions   Pending Prescriptions Disp Refills    ERGOCALCIFEROL 1.25 MG (37830 UT) Oral Cap [Pharmacy Med Name: Vitamin D 50,000 Unit Cap Stri] 12 capsule 0     Sig: take one capsule by mouth every week        There is no refill protocol information for this order         Recent Outpatient Visits              4 months ago Hyperlipidemia, unspecified hyperlipidemia type    [facility], [address], [place]    Office Visit    4 months ago Acute cystitis with hematuria    [facility], APRN    Office Visit    10 months ago AK (actinic keratosis)    [facility] Singh Giles MD    Office Visit    10 months ago Routine general medical examination at a health care facility    [facility], [address], [place]    Office Visit    1 year ago Allergic conjunctivitis of right eye    [address] [facility], APRN    Office Visit          Future Appointments         Provider Department Appt Notes    In 1 month Alfonso Morrison MD [facility], [address], [place] Followup on bloodwork

## 2021-07-07 RX ORDER — ERGOCALCIFEROL 1.25 MG/1
50000 CAPSULE ORAL WEEKLY
Qty: 12 CAPSULE | Refills: 0 | OUTPATIENT
Start: 2021-07-07

## 2021-09-07 ENCOUNTER — PATIENT MESSAGE (OUTPATIENT)
Dept: INTERNAL MEDICINE CLINIC | Facility: CLINIC | Age: 56
End: 2021-09-07

## 2021-09-07 DIAGNOSIS — Z12.31 SCREENING MAMMOGRAM, ENCOUNTER FOR: Primary | ICD-10-CM

## 2021-09-07 NOTE — TELEPHONE ENCOUNTER
From: Beth Vang  To: Yoanna Hallman MD  Sent: 9/7/2021 3:51 PM CDT  Subject: Referral Request    Do I need a referral to book my annual mammogram screening? Wanted to check before I schedule an appointment. Thanks and hope you are well.     Silviano Brand

## 2021-10-19 ENCOUNTER — HOSPITAL ENCOUNTER (OUTPATIENT)
Dept: MAMMOGRAPHY | Facility: HOSPITAL | Age: 56
Discharge: HOME | End: 2021-10-19
Attending: INTERNAL MEDICINE
Payer: COMMERCIAL

## 2021-10-19 DIAGNOSIS — Z12.31 SCREENING MAMMOGRAM, ENCOUNTER FOR: ICD-10-CM

## 2021-10-19 PROCEDURE — 77067 SCR MAMMO BI INCL CAD: CPT | Performed by: INTERNAL MEDICINE

## 2021-10-19 PROCEDURE — 77063 BREAST TOMOSYNTHESIS BI: CPT | Performed by: INTERNAL MEDICINE

## 2021-11-09 ENCOUNTER — HOSPITAL ENCOUNTER (OUTPATIENT)
Dept: ULTRASOUND IMAGING | Facility: HOSPITAL | Age: 56
Discharge: HOME | End: 2021-11-09
Attending: INTERNAL MEDICINE
Payer: COMMERCIAL

## 2021-11-09 DIAGNOSIS — K76.0 HEPATIC STEATOSIS: ICD-10-CM

## 2021-11-09 PROCEDURE — 76705 ECHO EXAM OF ABDOMEN: CPT | Performed by: INTERNAL MEDICINE

## 2021-11-24 RX ORDER — FENOFIBRATE 130 MG/1
130 CAPSULE ORAL
Qty: 90 CAPSULE | Refills: 1 | Status: SHIPPED | OUTPATIENT
Start: 2021-11-24

## 2021-11-24 NOTE — TELEPHONE ENCOUNTER
Please review. Protocol failed or has no protocol. Advised patient that overdue for fasting labs. Will try and get them done in the next few days.    Requested Prescriptions   Pending Prescriptions Disp Refills    FENOFIBRATE MICRONIZED 130 MG Oral Cap [Ph

## 2021-11-26 ENCOUNTER — LAB ENCOUNTER (OUTPATIENT)
Dept: LAB | Age: 56
End: 2021-11-26
Attending: INTERNAL MEDICINE
Payer: COMMERCIAL

## 2021-11-26 PROCEDURE — 80053 COMPREHEN METABOLIC PANEL: CPT | Performed by: INTERNAL MEDICINE

## 2021-11-26 PROCEDURE — 36415 COLL VENOUS BLD VENIPUNCTURE: CPT | Performed by: INTERNAL MEDICINE

## 2021-11-26 PROCEDURE — 80061 LIPID PANEL: CPT | Performed by: INTERNAL MEDICINE

## 2021-11-26 PROCEDURE — 85025 COMPLETE CBC W/AUTO DIFF WBC: CPT | Performed by: INTERNAL MEDICINE

## 2021-12-11 ENCOUNTER — LAB ENCOUNTER (OUTPATIENT)
Dept: LAB | Facility: HOSPITAL | Age: 56
End: 2021-12-11
Attending: INTERNAL MEDICINE
Payer: COMMERCIAL

## 2021-12-11 DIAGNOSIS — E78.5 HYPERLIPIDEMIA: Primary | ICD-10-CM

## 2021-12-11 PROCEDURE — 83036 HEMOGLOBIN GLYCOSYLATED A1C: CPT | Performed by: INTERNAL MEDICINE

## 2021-12-11 PROCEDURE — 80061 LIPID PANEL: CPT

## 2021-12-11 PROCEDURE — 36415 COLL VENOUS BLD VENIPUNCTURE: CPT | Performed by: INTERNAL MEDICINE

## 2021-12-11 PROCEDURE — 80053 COMPREHEN METABOLIC PANEL: CPT | Performed by: INTERNAL MEDICINE

## 2022-01-07 ENCOUNTER — OFFICE VISIT (OUTPATIENT)
Dept: INTERNAL MEDICINE CLINIC | Facility: CLINIC | Age: 57
End: 2022-01-07
Payer: COMMERCIAL

## 2022-01-07 VITALS
TEMPERATURE: 97 F | HEART RATE: 71 BPM | DIASTOLIC BLOOD PRESSURE: 77 MMHG | SYSTOLIC BLOOD PRESSURE: 122 MMHG | WEIGHT: 207.81 LBS | HEIGHT: 68 IN | RESPIRATION RATE: 16 BRPM | BODY MASS INDEX: 31.49 KG/M2

## 2022-01-07 DIAGNOSIS — E55.9 VITAMIN D DEFICIENCY: ICD-10-CM

## 2022-01-07 DIAGNOSIS — K76.0 HEPATIC STEATOSIS: ICD-10-CM

## 2022-01-07 DIAGNOSIS — E78.5 HYPERLIPIDEMIA, UNSPECIFIED HYPERLIPIDEMIA TYPE: Primary | ICD-10-CM

## 2022-01-07 DIAGNOSIS — Z00.00 ROUTINE PHYSICAL EXAMINATION: ICD-10-CM

## 2022-01-07 PROCEDURE — 3078F DIAST BP <80 MM HG: CPT | Performed by: INTERNAL MEDICINE

## 2022-01-07 PROCEDURE — 3008F BODY MASS INDEX DOCD: CPT | Performed by: INTERNAL MEDICINE

## 2022-01-07 PROCEDURE — 3074F SYST BP LT 130 MM HG: CPT | Performed by: INTERNAL MEDICINE

## 2022-01-07 PROCEDURE — 99396 PREV VISIT EST AGE 40-64: CPT | Performed by: INTERNAL MEDICINE

## 2022-01-07 RX ORDER — ERGOCALCIFEROL 1.25 MG/1
50000 CAPSULE ORAL WEEKLY
Qty: 12 CAPSULE | Refills: 0 | Status: SHIPPED | OUTPATIENT
Start: 2022-01-07

## 2022-01-07 NOTE — ASSESSMENT & PLAN NOTE
Lipid panel with elevated triglycerides. Continue to watch the diet carefully, restrict starches, alcohol, sugars and desserts in the diet. Exercise on a regular basis. Recheck in 6 months.

## 2022-02-05 ENCOUNTER — OFFICE VISIT (OUTPATIENT)
Dept: INTERNAL MEDICINE CLINIC | Facility: CLINIC | Age: 57
End: 2022-02-05
Payer: COMMERCIAL

## 2022-02-05 ENCOUNTER — NURSE TRIAGE (OUTPATIENT)
Dept: INTERNAL MEDICINE CLINIC | Facility: CLINIC | Age: 57
End: 2022-02-05

## 2022-02-05 ENCOUNTER — HOSPITAL ENCOUNTER (OUTPATIENT)
Dept: GENERAL RADIOLOGY | Facility: HOSPITAL | Age: 57
Discharge: HOME | End: 2022-02-05
Attending: NURSE PRACTITIONER
Payer: COMMERCIAL

## 2022-02-05 VITALS
WEIGHT: 204 LBS | HEIGHT: 68 IN | SYSTOLIC BLOOD PRESSURE: 132 MMHG | BODY MASS INDEX: 30.92 KG/M2 | HEART RATE: 76 BPM | DIASTOLIC BLOOD PRESSURE: 84 MMHG

## 2022-02-05 DIAGNOSIS — S39.92XA INJURY OF COCCYX, INITIAL ENCOUNTER: ICD-10-CM

## 2022-02-05 DIAGNOSIS — G89.29 CHRONIC PAIN OF LEFT KNEE: ICD-10-CM

## 2022-02-05 DIAGNOSIS — M53.3 COCCYDYNIA: ICD-10-CM

## 2022-02-05 DIAGNOSIS — M25.562 CHRONIC PAIN OF LEFT KNEE: ICD-10-CM

## 2022-02-05 DIAGNOSIS — S39.92XA INJURY OF COCCYX, INITIAL ENCOUNTER: Primary | ICD-10-CM

## 2022-02-05 DIAGNOSIS — K76.0 HEPATIC STEATOSIS: ICD-10-CM

## 2022-02-05 PROCEDURE — 3075F SYST BP GE 130 - 139MM HG: CPT | Performed by: NURSE PRACTITIONER

## 2022-02-05 PROCEDURE — 72220 X-RAY EXAM SACRUM TAILBONE: CPT | Performed by: NURSE PRACTITIONER

## 2022-02-05 PROCEDURE — 99214 OFFICE O/P EST MOD 30 MIN: CPT | Performed by: NURSE PRACTITIONER

## 2022-02-05 PROCEDURE — 3079F DIAST BP 80-89 MM HG: CPT | Performed by: NURSE PRACTITIONER

## 2022-02-05 PROCEDURE — 3008F BODY MASS INDEX DOCD: CPT | Performed by: NURSE PRACTITIONER

## 2022-02-05 NOTE — ASSESSMENT & PLAN NOTE
Patient has had left knee pain in the past and had an MRI and was following with Dr. Sergio Sosa.  No redness, swelling or warmth. She is wearing a brace. Negative for infrapatellar space and suprapatellar pouch. Alignment of knee symmetrical. Negative Obed Test. No gap or pain in the valgus or Varus stress test.    Plan  1) Ice areas of discomfort 15 to 20  minutes four times per day. 2) Tylenol two tabs for pain every eight hours  3) Can continue to wear the brace    Follow up with orthopedics for steroid injection if pain persists.

## 2022-02-05 NOTE — TELEPHONE ENCOUNTER
Tailbone pain x 1 month. Noemí Aliment about 1 month ago, now left knee pain. Can walk. When twists there is pain. appt made for evaluation. Agreed to date, time and location. To call back or go to ER if s/s worsen of fail to improve as anticipated. Verbalized good understanding of all with intent to comply.

## (undated) NOTE — Clinical Note
I saw Keila Tanner in the DEPARTMENT [facility] today for Cystitis  (primary encounter diagnosis). she was treated with macrobid. Keila Flores will follow up with you if no better or as needed. Thank you for the opportunity to care for Keila Tanner today.

## (undated) NOTE — LETTER
11/27/19        [address] [facility]      Dear Maninder Williamson,    [address] [facility] records indicate that you have outstanding lab work and or testing that was ordered for you and has not yet been completed:  Orders Placed This Encounter

## (undated) NOTE — LETTER
02/21/18        [address] [facility]      Dear Teri [facility],    [address] [facility] records indicate that you have outstanding lab work and or testing that was ordered for you and has not yet been completed:          CBC W Differential W

## (undated) NOTE — LETTER
AUTHORIZATION FOR SURGICAL OPERATION OR OTHER PROCEDURE    1. I hereby authorize Dr. Jennyfer Cassidy, and [facility] staff assigned to my case to perform the following operation and/or procedure at the [facility]:    [facility]    2.   My physician h Responsible person                          []  Spouse  In case of minor or                    [] Other  _____________   Incompetent name:  __________________________________________________                               (please print)      _____________